# Patient Record
Sex: MALE | Race: WHITE | NOT HISPANIC OR LATINO | URBAN - METROPOLITAN AREA
[De-identification: names, ages, dates, MRNs, and addresses within clinical notes are randomized per-mention and may not be internally consistent; named-entity substitution may affect disease eponyms.]

---

## 2023-02-24 ENCOUNTER — EMERGENCY (EMERGENCY)
Facility: HOSPITAL | Age: 43
LOS: 0 days | Discharge: HOME | End: 2023-02-24
Attending: EMERGENCY MEDICINE
Payer: COMMERCIAL

## 2023-02-24 VITALS
RESPIRATION RATE: 19 BRPM | HEART RATE: 70 BPM | DIASTOLIC BLOOD PRESSURE: 82 MMHG | TEMPERATURE: 96 F | SYSTOLIC BLOOD PRESSURE: 149 MMHG | OXYGEN SATURATION: 97 % | WEIGHT: 205.03 LBS
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
## 2023-02-24 DIAGNOSIS — M54.2 CERVICALGIA: ICD-10-CM
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
## 2023-02-24 DIAGNOSIS — S16.1XXA STRAIN OF MUSCLE, FASCIA AND TENDON AT NECK LEVEL, INITIAL ENCOUNTER: ICD-10-CM
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
## 2023-02-24 PROCEDURE — 99282 EMERGENCY DEPT VISIT SF MDM: CPT

## 2023-02-24 PROCEDURE — 99284 EMERGENCY DEPT VISIT MOD MDM: CPT

## 2023-02-24 RX ORDER — METHOCARBAMOL 500 MG/1
2 TABLET, FILM COATED ORAL
Qty: 32 | Refills: 0
Start: 2023-02-24 | End: 2023-02-27

## 2023-02-24 RX ORDER — IBUPROFEN 200 MG
600 TABLET ORAL ONCE
Refills: 0 | Status: COMPLETED | OUTPATIENT
Start: 2023-02-24 | End: 2023-02-24

## 2023-02-24 RX ADMIN — Medication 600 MILLIGRAM(S): at 17:45

## 2023-02-24 NOTE — ED PROVIDER NOTE - PROVIDER TOKENS
Kristina Medina is a 29 year old woman who presents today for routine gynecologic exam.  She is sexually active. Patient is currently using birth control pills for birth control. In regards to her menstrual cycles, Kristina reports no issues. She has minimal if any  symptoms of dysmenorrhea.  Getting  in Oct. 2023    OBSTETRIC/GYNECOLOGIC HISTORY:        OB History    Para Term  AB Living   0 0 0 0 0 0   SAB IAB Ectopic Molar Multiple Live Births   0 0 0 0 0 0    Patient's last menstrual period was 2023.      Pap smears have been abnormal previous CIN2-3  PAST MEDICAL HISTORY:    Past Medical History:   Diagnosis Date   • COVID-19 2022   • PMH of     First mense cycle started at age 13 yrs     PAST SURGICAL HISTORY:   Past Surgical History:   Procedure Laterality Date   • Colposcopy  2020   • Leep  2020    JESUSITA 2-3 on colpo. JESUSITA 1 on LEEP specimen      SOCIAL HISTORY:    Social History     Occupational History   • Occupation: [employer]     Comment: Dental [employer]   Tobacco Use   • Smoking status: Never   • Smokeless tobacco: Never   • Tobacco comments:     no smokers in the home   Vaping Use   • Vaping Use: never used   Substance and Sexual Activity   • Alcohol use: Yes     Alcohol/week: 0.0 standard drinks     Comment: social/rare   • Drug use: No   • Sexual activity: Yes     Partners: Male     Birth control/protection: OCP, Condom      Review of patient's social economics indicates:   [employer]                 Comment: Dental [employer]        FAMILY HISTORY:    Family History   Problem Relation Age of Onset   • Allergic Rhinitis Mother    • Asthma Mother    • Arthritis Father    • Arthritis Maternal Grandmother    • Cancer Maternal Grandmother 49        breast cancer   • Hypertension Maternal Grandfather    • Allergic Rhinitis Paternal Grandmother    • Arthritis Paternal Grandfather    • Hypertension Paternal Grandfather    • Diabetes Paternal Uncle    • Arthritis  Paternal Uncle    • High cholesterol Paternal Uncle    • Cancer Other         2023 NEG ov, ut, co     REVIEW OF SYSTEMS:deneis  CONSTITUTIONAL: Denies fever/sweats and unintentional weight change.  CARDIOVASCULAR:  Denies chest discomfort with exertion, shortness of breath or palpitations.   RESPIRATORY: Denies cough, shortness of breath,change in exercise tolerance.   GASTROINTESTINAL:  Denies abdominal pain, nausea, change in bowel habits, melena.   GENITOURINARY: Denies frequency, dysuria.  MUSCULOSKELETAL: Denies joint pain, muscle aches.   SKIN:  Denies concerns, changing moles.   NEUROLOGIC:  Denies changes.  PSYCHIATRIC: Denies changes.  HEMATOLOGIC/LYMPHATIC: Denies abnormal bruising or bleeding,lumps or bumps.     OBJECTIVE:  VITALS:   Visit Vitals  /72   Ht 5' 7\" (1.702 m)   Wt 69.4 kg   LMP 01/12/2023   BMI 23.96 kg/m²      GENERAL: Alert & oriented x 3, mood and affect are appropriate.   HEENT: Neck is supple with no lymphadenopathy. Thyroid is normal size  SKIN: Warm and moist without erythema or new lesions  BREASTS: Without masses or nipple discharge bilaterally. Breast self-examination reviewed and encouraged.  ABDOMEN: Soft, non-tender, no masses or distention present, no hepatosplenomegaly, no hernias present   EXTREMITIES: No erythema, no edema  LYMPH: No palpable neck, axillary or groin nodes  GYNECOLOGIC: External genitalia show no lesions  URETHRAL MEATUS: No polyps and well supported  LABIA MINORA/MAJORA: Normal, no lesions  SKENE'S GLANDS: No erythema present  VAGINA:  Normal mucosa  CERVIX: Normal, without  lesions or masses  and pap smear obtained  UTERUS: Normal size, mobile , non-tender, anteverted  ADNEXA: No masses  and no tenderness  RECTAL: No external hemorrhoids      IMPRESSION:  > Normal gynecologic exam  > Good results with birth control pills  > Preventative Health    PLAN:  > I discussed with the patient the most recent guidelines regarding initiating Pap screening.  We  discussed that screening should begin at age 21. I also discussed safe sex practices and the need to use birth control in order to prevent pregnancy. She is also aware that she still needs annual exams.     > STD (Sexually transmitted disease) screening performed: chlamydial, gonorrhea , HIV, hep B, syphillis and trichomonas  We discussed safe sex practices and reinforced need for condom use to prevent transmission of STDs and HIV.    >Prescription given for OCP    > Return to clinic 1 year or as needed.   PROVIDER:[TOKEN:[[id number]],FOLLOWUP:[7-10 Days]]

## 2023-02-24 NOTE — ED PROVIDER NOTE - PATIENT PORTAL LINK FT
You can access the FollowMyHealth Patient Portal offered by [facility] by registering at the following website: [email or web address]. By joining [employer]’s FollowMyHealth portal, you will also be able to view your health information using other applications (apps) compatible with our system.

## 2023-02-24 NOTE — ED PROVIDER NOTE - NS ED ROS FT
Constitutional: No fever   Eyes:  No visual changes  Ears:  No hearing changes  Neck: +neck pain  Cardiac:  No chest pain  Respiratory:  No SOB   GI:  No abdominal pain, nausea, or vomiting  :  No dysuria  MS:  No back pain  Neuro:  No headache or weakness.  No LOC  Skin:  No skin rash
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
## 2023-02-24 NOTE — ED PROVIDER NOTE - PHYSICAL EXAMINATION
CONSTITUTIONAL: well-developed, well-nourished, in no acute distress  SKIN: warm, dry  HEAD: Normocephalic  EYES: no conjunctival erythema  ENT: no nasal discharge, airway clear  NECK: full ROM, non-tender  CARD: regular rate and rhythm  RESP: normal respiratory effort, no wheezes, rales or rhonchi  ABD: soft, non-distended, non-tender  EXT: moving all extremities spontaneously  NEURO: alert and oriented x3 strength and sensation 5/5 b/l UE and LE, CN2-12 intact, finger to nose normal b/l, able to ambulate without difficulty  PSYCH: cooperative, appropriate

## 2023-02-24 NOTE — ED PROVIDER NOTE - CARE PROVIDER_API CALL
Robert Faye; MPH)  Anesthesiology; Pain Medicine  [address] [place] [address]  Phone: [phone]  Fax: [phone]  Follow Up Time: 7-10 Days

## 2023-02-24 NOTE — ED PROVIDER NOTE - NSFOLLOWUPINSTRUCTIONS_ED_ALL_ED_FT

## 2023-11-30 ENCOUNTER — EMERGENCY (EMERGENCY)
Facility: HOSPITAL | Age: 43
LOS: 0 days | Discharge: HOME | End: 2023-11-30
Attending: EMERGENCY MEDICINE
Payer: COMMERCIAL

## 2023-11-30 VITALS
OXYGEN SATURATION: 99 % | RESPIRATION RATE: 18 BRPM | TEMPERATURE: 99 F | HEART RATE: 80 BPM | DIASTOLIC BLOOD PRESSURE: 80 MMHG | SYSTOLIC BLOOD PRESSURE: 129 MMHG

## 2023-11-30 DIAGNOSIS — Z77.21 CONTACT WITH AND (SUSPECTED) EXPOSURE TO POTENTIALLY HAZARDOUS BODY FLUIDS: ICD-10-CM

## 2023-11-30 PROCEDURE — 99282 EMERGENCY DEPT VISIT SF MDM: CPT

## 2023-11-30 PROCEDURE — 99283 EMERGENCY DEPT VISIT LOW MDM: CPT
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
## 2023-11-30 NOTE — ED PROVIDER NOTE - PATIENT PORTAL LINK FT
You can access the FollowMyHealth Patient Portal offered by [facility] by registering at the following website: [email or web address]. By joining [employer]’s FollowMyHealth portal, you will also be able to view your health information using other applications (apps) compatible with our system. You can access the FollowMyHealth Patient Portal offered by [facility] by registering at the following website: [email or web address]. By joining [employer]’s FollowMyHealth portal, you will also be able to view your health information using other applications (apps) compatible with our system. You can access the FollowMyHealth Patient Portal offered by [facility] by registering at the following website: [email or web address]. By joining [employer]’s FollowMyHealth portal, you will also be able to view your health information using other applications (apps) compatible with our system.
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
## 2023-11-30 NOTE — ED ADULT NURSE NOTE - NS ED NURSE RECORD ANOTHER HT AND WT
Outcome: scheuled for Comprehensive Geriatric Assessment  Wednesday, June 16, 2021  2:00 pm   APRN [facility] - [phone]    Please transfer to Patient Outreach Team at [phone] when patient returns call.        Attempt #1      Yes
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
